# Patient Record
Sex: MALE | Race: WHITE | NOT HISPANIC OR LATINO
[De-identification: names, ages, dates, MRNs, and addresses within clinical notes are randomized per-mention and may not be internally consistent; named-entity substitution may affect disease eponyms.]

---

## 2018-07-18 ENCOUNTER — TRANSCRIPTION ENCOUNTER (OUTPATIENT)
Age: 28
End: 2018-07-18

## 2021-06-09 ENCOUNTER — EMERGENCY (EMERGENCY)
Facility: HOSPITAL | Age: 31
LOS: 1 days | End: 2021-06-09
Admitting: EMERGENCY MEDICINE
Payer: OTHER MISCELLANEOUS

## 2021-06-09 PROCEDURE — 99283 EMERGENCY DEPT VISIT LOW MDM: CPT | Mod: 25

## 2021-06-09 PROCEDURE — 99053 MED SERV 10PM-8AM 24 HR FAC: CPT

## 2021-06-10 PROCEDURE — 73130 X-RAY EXAM OF HAND: CPT | Mod: 26,RT

## 2021-06-10 PROCEDURE — 73110 X-RAY EXAM OF WRIST: CPT | Mod: 26,RT

## 2023-07-28 PROBLEM — Z00.00 ENCOUNTER FOR PREVENTIVE HEALTH EXAMINATION: Status: ACTIVE | Noted: 2023-07-28

## 2023-08-03 ENCOUNTER — APPOINTMENT (OUTPATIENT)
Dept: ORTHOPEDIC SURGERY | Facility: CLINIC | Age: 33
End: 2023-08-03
Payer: OTHER MISCELLANEOUS

## 2023-08-03 ENCOUNTER — APPOINTMENT (OUTPATIENT)
Dept: MRI IMAGING | Facility: CLINIC | Age: 33
End: 2023-08-03
Payer: OTHER MISCELLANEOUS

## 2023-08-03 VITALS — WEIGHT: 190 LBS | HEIGHT: 70 IN | BODY MASS INDEX: 27.2 KG/M2

## 2023-08-03 DIAGNOSIS — Z78.9 OTHER SPECIFIED HEALTH STATUS: ICD-10-CM

## 2023-08-03 PROCEDURE — 99214 OFFICE O/P EST MOD 30 MIN: CPT

## 2023-08-03 PROCEDURE — 73564 X-RAY EXAM KNEE 4 OR MORE: CPT | Mod: LT

## 2023-08-03 PROCEDURE — 73721 MRI JNT OF LWR EXTRE W/O DYE: CPT | Mod: LT

## 2023-08-04 NOTE — HISTORY OF PRESENT ILLNESS
[de-identified] : The patient is a 33 year year old right hand dominant male  who presents today for left knee pain. Date of Injury/Onset: 7/24/23 Pain: At Rest: 4/10 With Activity:  6-7/10 Mechanism of injury: while chasing a suspect at work his left leg got stuck between a car door and parked van causing his knee to get injured This is  a Work Related Injury being treated under Worker's Compensation. This is not an athletic injury occurring associated with an interscholastic or organized sports team. Quality of symptoms: sharp, dull, clicking Improves with: ice, tylenol Worse with: prolonged standing/sitting/walking Treatment/Imaging/Studies Since Last Visit:  Reports Available For Review Today:  Out of work/sport: out of work since 7/24/23 School/Sport/Position/Occupation: [employer]  Additional Information: none

## 2023-08-04 NOTE — DATA REVIEWED
[MRI] : MRI [Left] : left [Knee] : knee [Report was reviewed and noted in the chart] : The report was reviewed and noted in the chart [I independently reviewed and interpreted images and report] : I independently reviewed and interpreted images and report [I reviewed the films/CD and additionally noted] : I reviewed the films/CD and additionally noted [FreeTextEntry1] : contusion over the proximal MCL

## 2023-08-04 NOTE — IMAGING
[Left] : left knee [All Views] : anteroposterior, lateral, skyline, and anteroposterior standing [There are no fractures, subluxations or dislocations. No significant abnormalities are seen] : There are no fractures, subluxations or dislocations. No significant abnormalities are seen [de-identified] : The patient is a well appearing 33 year  old male of their stated age.  Patient ambulates with a normal gait.  Negative straight leg raise bilateral   Effected Knee: LEFT                         	  ROM:  0-145 degrees  Lachman: Negative  Pivot Shift: Negative  Anterior Drawer: Negative  Posterior Drawer / Sag:Negative  Varus Stress 0 degrees: Stable  Varus Stress 30 degrees: Stable  Valgus Stress 0 degrees: Stable  Valgus Stress 30 degrees: 2+ GAPPING  Medial Kee: Negative  Lateral Kee: Negative  Patella Glide: 2+  Patella Apprehension: Negative  Patella Grind: Negative   Palpation:  Medial Joint Line: TENDER  Lateral Joint Line: Nontender  Medial Collateral Ligament: TENDER PROXIMALLY  Lateral Collateral Ligament/PLC: Nontender  Distal Femur: Nontender  Proximal Tibia: Nontender  Tibial Tubercle: Nontender  Distal Pole Patella: Nontender  Quadriceps Tendon: Nontender &  Intact  Patella Tendon: Nontender &  Intact  Medial Femoral Condyle: Nontender  Medial Distal Hamstring/PES: Nontender  Lateral Distal Hamstring: Nontender  Medial Femoral Condyle: TENDER  Iliotibial Band: Nontender & Intact  Medial Patellofemoral Ligament: Nontender  Adductor: Nontender  Proximal GSC-Plantaris: Nontender  Calf: Supple & Nontender   Inspection:  Deformity: No  Erythema: No  Ecchymosis: No  Abrasions: No  Effusion: No  Prepatella Bursitis: No  Neurologic Exam:  Sensation L4-S1: Grossly Intact  Motor Exam:  Quadriceps: 5 out of 5  Hamstrings: 5 out of 5  EHL: 5 out of 5  FHL: 5 out of 5  TA: 5 out of 5  GS: 5 out of 5  Circulatory/Pulses:  Dorsalis Pedis: 2+  Posterior Tibialis: 2+  Additional Pertinent Findings: None   Contralateral Knee:                           	  ROM: 0-145 degrees  Other Pertinent Findings: None   Assessment: The patient is a 33 year  old male  with left knee pain and radiographic and physical exam findings consistent with knee contusion.     The patients condition is acute  Documents/Results Reviewed Today: X-Ray left knee and STAT MRI left knee  Tests/Studies Independently Interpreted Today: X-Ray left knee is unremarkable. STAT MRI left knee reveals contusion over the proximal MCL  Pertinent findings include: medial femoral condyle tenderness, medial joint line tenderness, proximal MCL tenderness, 2+ gapping at 30 degrees valgus  Confounding medical conditions/concerns: None  Plan: Patient will start physical therapy, HEP, and stretching. Take OTC antiinflammatories as needed - use as directed. Patient is permitted to work as tolerated. Discussed activity modifications. Patient is cleared to advance activity as tolerated.  Tests Ordered: None  Prescription Medications Ordered: Discussed appropriate use of OTC anti-inflammatories and topical analgesics (including but not limited to Aleve, Advil, Tylenol, Motrin, Ibuprofen, Voltaren gel, etc.)  Braces/DME Ordered: None  Activity/Work/Sports Status: None  Additional Instructions: None Follow-Up: PRN  The patient's current medication management of their orthopedic diagnosis was reviewed today: (1) We discussed a comprehensive treatment plan that included possible pharmaceutical management involving the use of prescription strength medications including but not limited to options such as oral Naprosyn 500mg BID, once daily Meloxicam 15 mg, or 500-650 mg Tylenol versus over the counter oral medications and topical prescription NSAID Pennsaid vs over the counter Voltaren gel.  Based on our extensive discussion, the patient declined prescription medication and will use over the counter Advil, Alleve, Voltaren Gel or Tylenol as directed. (2) There is a moderate risk of morbidity with further treatment, especially from use of prescription strength medications and possible side effects of these medications which include upset stomach with oral medications, skin reactions to topical medications and cardiac/renal issues with long term use. (3) I recommended that the patient follow-up with their medical physician to discuss any significant specific potential issues with long term medication use such as interactions with current medications or with exacerbation of underlying medical comorbidities. (4) The benefits and risks associated with use of injectable, oral or topical, prescription and over the counter anti-inflammatory medications were discussed with the patient. The patient voiced understanding of the risks including but not limited to bleeding, stroke, kidney dysfunction, heart disease, and were referred to the black box warning label for further information.   I, Genevieve Benz, attest that this documentation has been prepared under the direction and in the presence of Provider Dr. Rm Nguyen.  The documentation recorded by the scribe accurately reflects the services IDr. Rm, personally performed and the decisions made by me.

## 2023-08-08 ENCOUNTER — APPOINTMENT (OUTPATIENT)
Dept: ORTHOPEDIC SURGERY | Facility: CLINIC | Age: 33
End: 2023-08-08

## 2023-08-09 ENCOUNTER — NON-APPOINTMENT (OUTPATIENT)
Age: 33
End: 2023-08-09

## 2023-08-24 ENCOUNTER — APPOINTMENT (OUTPATIENT)
Dept: ORTHOPEDIC SURGERY | Facility: CLINIC | Age: 33
End: 2023-08-24

## 2023-09-15 ENCOUNTER — NON-APPOINTMENT (OUTPATIENT)
Age: 33
End: 2023-09-15

## 2024-04-11 ENCOUNTER — APPOINTMENT (OUTPATIENT)
Dept: ORTHOPEDIC SURGERY | Facility: CLINIC | Age: 34
End: 2024-04-11
Payer: OTHER MISCELLANEOUS

## 2024-04-11 VITALS — BODY MASS INDEX: 27.2 KG/M2 | WEIGHT: 190 LBS | HEIGHT: 70 IN

## 2024-04-11 PROCEDURE — 99213 OFFICE O/P EST LOW 20 MIN: CPT

## 2024-04-11 NOTE — IMAGING
[de-identified] : The patient is a well appearing 34 year  old male of their stated age.  Patient ambulates with a normal gait.  Negative straight leg raise bilateral   Effected Knee: LEFT                         	  ROM:  0-145 degrees  Lachman: Negative  Pivot Shift: Negative  Anterior Drawer: Negative  Posterior Drawer / Sag:Negative  Varus Stress 0 degrees: Stable  Varus Stress 30 degrees: Stable  Valgus Stress 0 degrees: Stable  Valgus Stress 30 degrees: 2+ GAPPING  Medial Kee: Negative  Lateral Kee: Negative  Patella Glide: 2+  Patella Apprehension: Negative  Patella Grind: Negative   Palpation:  Medial Joint Line: Nontender  Lateral Joint Line: Nontender  Medial Collateral Ligament: Nontender   Lateral Collateral Ligament/PLC: Nontender  Distal Femur: Nontender  Proximal Tibia: Nontender  Tibial Tubercle: Nontender  Distal Pole Patella: Nontender  Quadriceps Tendon: Nontender &  Intact  Patella Tendon: Nontender &  Intact  Medial Femoral Condyle: Nontender  Medial Distal Hamstring/PES: Nontender  Lateral Distal Hamstring: Nontender  Medial Femoral Condyle: Nontender  Iliotibial Band: Nontender & Intact  Medial Patellofemoral Ligament: Nontender  Adductor: Nontender  Proximal GSC-Plantaris: Nontender  Calf: Supple & Nontender   Inspection:  Deformity: No  Erythema: No  Ecchymosis: No  Abrasions: No  Effusion: No  Prepatella Bursitis: No  Neurologic Exam:  Sensation L4-S1: Grossly Intact  Motor Exam:  Quadriceps: 4 out of 5  Hamstrings: 5 out of 5  EHL: 5 out of 5  FHL: 5 out of 5  TA: 5 out of 5  GS: 5 out of 5  Circulatory/Pulses:  Dorsalis Pedis: 2+  Posterior Tibialis: 2+  Additional Pertinent Findings: None   Contralateral Knee:                           	  ROM: 0-145 degrees  Other Pertinent Findings: None   Assessment: The patient is a 33 year  old male  with left knee pain and radiographic and physical exam findings consistent with knee contusion.     The patients condition is acute  Documents/Results Reviewed Today: None  Tests/Studies Independently Interpreted Today: None  Pertinent findings include: 4/5 quadriceps strength, no tenderness at this time  Confounding medical conditions/concerns: None  Plan: Again, we discussed treatment options for the patient's knee contusion. Patient will start physical therapy, HEP, and stretching to work on quad strengthening. Take OTC antiinflammatories as needed - use as directed. Patient is permitted to work as tolerated.  Tests Ordered: None  Prescription Medications Ordered: Discussed appropriate use of OTC anti-inflammatories and topical analgesics (including but not limited to Aleve, Advil, Tylenol, Motrin, Ibuprofen, Voltaren gel, etc.)  Braces/DME Ordered: None  Activity/Work/Sports Status: None  Additional Instructions: None Follow-Up: 4 weeks   The patient's current medication management of their orthopedic diagnosis was reviewed today: (1) We discussed a comprehensive treatment plan that included possible pharmaceutical management involving the use of prescription strength medications including but not limited to options such as oral Naprosyn 500mg BID, once daily Meloxicam 15 mg, or 500-650 mg Tylenol versus over the counter oral medications and topical prescription NSAID Pennsaid vs over the counter Voltaren gel.  Based on our extensive discussion, the patient declined prescription medication and will use over the counter Advil, Alleve, Voltaren Gel or Tylenol as directed. (2) There is a moderate risk of morbidity with further treatment, especially from use of prescription strength medications and possible side effects of these medications which include upset stomach with oral medications, skin reactions to topical medications and cardiac/renal issues with long term use. (3) I recommended that the patient follow-up with their medical physician to discuss any significant specific potential issues with long term medication use such as interactions with current medications or with exacerbation of underlying medical comorbidities. (4) The benefits and risks associated with use of injectable, oral or topical, prescription and over the counter anti-inflammatory medications were discussed with the patient. The patient voiced understanding of the risks including but not limited to bleeding, stroke, kidney dysfunction, heart disease, and were referred to the black box warning label for further information.  Daniela HURST attest that this documentation has been prepared under the direction and in the presence of Provider Dr. Rm Nguyen.   The documentation recorded by the scribe accurately reflects the service Stefani HURST PA-C, personally performed and the decisions made by me. The patient was seen by me under the direct supervision of Dr. Rm Nguyen.

## 2024-04-11 NOTE — WORK
[Other: ___] : [unfilled] [Was the competent medical cause of the injury] : was the competent medical cause of the injury [Are consistent with the injury] : are consistent with the injury [Consistent with my objective findings] : consistent with my objective findings [Partial] : partial [Does not reveal pre-existing condition(s) that may affect treatment/prognosis] : does not reveal pre-existing condition(s) that may affect treatment/prognosis [Patient] : patient [No Rx restrictions] : No Rx restrictions. [I provided the services listed above] :  I provided the services listed above.

## 2024-04-11 NOTE — HISTORY OF PRESENT ILLNESS
[de-identified] : The patient is a 34 year year old right hand dominant male  who presents today for left knee pain. Date of Injury/Onset: 7/24/23 Pain: At Rest: 0-1/10 With Activity:  3-4/10 Mechanism of injury: while chasing a suspect at work his left leg got stuck between a car door and parked van causing his knee to get injured This is  a Work Related Injury being treated under Worker's Compensation. This is not an athletic injury occurring associated with an interscholastic or organized sports team. Quality of symptoms: random sharp pain Improves with: ice, tylenol Worse with: prolonged standing/sitting/walking Treatment/Imaging/Studies Since Last Visit: HEP/stretching                              Reports Available For Review Today:  Out of work/sport: currently working School/Sport/Position/Occupation: [employer]  Changes since last visit: patient reports overall he has been feeling better since last visit. w/c department recommended he should do some PT because he still will get random bouts of pain.  Additional Information: none

## 2024-05-23 ENCOUNTER — APPOINTMENT (OUTPATIENT)
Dept: ORTHOPEDIC SURGERY | Facility: CLINIC | Age: 34
End: 2024-05-23
Payer: OTHER MISCELLANEOUS

## 2024-05-23 VITALS — WEIGHT: 190 LBS | HEIGHT: 70 IN | BODY MASS INDEX: 27.2 KG/M2

## 2024-05-23 DIAGNOSIS — M25.562 PAIN IN LEFT KNEE: ICD-10-CM

## 2024-05-23 DIAGNOSIS — S80.02XA CONTUSION OF LEFT KNEE, INITIAL ENCOUNTER: ICD-10-CM

## 2024-05-23 PROCEDURE — 99213 OFFICE O/P EST LOW 20 MIN: CPT

## 2024-05-24 NOTE — HISTORY OF PRESENT ILLNESS
[de-identified] : The patient is a 34 year year old right hand dominant male  who presents today for left knee pain. Date of Injury/Onset: 7/24/23 Pain: At Rest: 0/10 With Activity:  2/10 Mechanism of injury: while chasing a suspect at work his left leg got stuck between a car door and parked van causing his knee to get injured This is  a Work Related Injury being treated under Worker's Compensation. This is not an athletic injury occurring associated with an interscholastic or organized sports team. Quality of symptoms: weakness, instability Improves with: ice, tylenol Worse with: running Treatment/Imaging/Studies Since Last Visit: PT, HEP                              Reports Available For Review Today:  Out of work/sport: currently working School/Sport/Position/Occupation: [employer]  Changes since last visit: Patient reports that his symptoms have improved since going to PT. Continues to see improvement in strength with PT,  Additional Information: none

## 2024-05-24 NOTE — IMAGING
[de-identified] : The patient is a well appearing 34 year  old male of their stated age.  Patient ambulates with a normal gait.  Negative straight leg raise bilateral   Effected Knee: LEFT                         	  ROM:  0-145 degrees  Lachman: Negative  Pivot Shift: Negative  Anterior Drawer: Negative  Posterior Drawer / Sag:Negative  Varus Stress 0 degrees: Stable  Varus Stress 30 degrees: Stable  Valgus Stress 0 degrees: Stable  Valgus Stress 30 degrees: 2+ GAPPING  Medial Kee: Negative  Lateral Kee: Negative  Patella Glide: 2+  Patella Apprehension: Negative  Patella Grind: Negative   Palpation:  Medial Joint Line: Nontender  Lateral Joint Line: Nontender  Medial Collateral Ligament: Nontender   Lateral Collateral Ligament/PLC: Nontender  Distal Femur: Nontender  Proximal Tibia: Nontender  Tibial Tubercle: Nontender  Distal Pole Patella: Nontender  Quadriceps Tendon: Nontender &  Intact  Patella Tendon: Nontender &  Intact  Medial Femoral Condyle: Nontender  Medial Distal Hamstring/PES: Nontender  Lateral Distal Hamstring: Nontender  Medial Femoral Condyle: Nontender  Iliotibial Band: Nontender & Intact  Medial Patellofemoral Ligament: Nontender  Adductor: Nontender  Proximal GSC-Plantaris: Nontender  Calf: Supple & Nontender   Inspection:  Deformity: No  Erythema: No  Ecchymosis: No  Abrasions: No  Effusion: No  Prepatella Bursitis: No  Neurologic Exam:  Sensation L4-S1: Grossly Intact  Motor Exam:  Quadriceps: 5 out of 5  Hamstrings: 5 out of 5  EHL: 5 out of 5  FHL: 5 out of 5  TA: 5 out of 5  GS: 5 out of 5  Circulatory/Pulses:  Dorsalis Pedis: 2+  Posterior Tibialis: 2+  Additional Pertinent Findings: None   Contralateral Knee:                           	  ROM: 0-145 degrees  Other Pertinent Findings: None   Assessment: The patient is a 33 year old male with left knee pain and radiographic and physical exam findings consistent with knee contusion.     The patients condition is acute  Documents/Results Reviewed Today: None  Tests/Studies Independently Interpreted Today: None  Pertinent findings include: 0-145, 5/5 quadriceps strength, no tenderness at this time  Confounding medical conditions/concerns: None  Plan: The patient reports gradual, interval improvement in pain and mechanical symptoms related to knee contusion. At this time, the patient has no activity restrictions and will continue to increase on an interval basis. He will finish out physical therapy, transitioning into HEP and strengthening. He will follow up for a [facility], otherwise PRN.   Tests Ordered: None  Prescription Medications Ordered: Discussed appropriate use of OTC anti-inflammatories and topical analgesics (including but not limited to Aleve, Advil, Tylenol, Motrin, Ibuprofen, Voltaren gel, etc.)  Braces/DME Ordered: None  Activity/Work/Sports Status: Currently working  Additional Instructions: None Follow-Up: PRN or for SL[facility]   The patient's current medication management of their orthopedic diagnosis was reviewed today: (1) We discussed a comprehensive treatment plan that included possible pharmaceutical management involving the use of prescription strength medications including but not limited to options such as oral Naprosyn 500mg BID, once daily Meloxicam 15 mg, or 500-650 mg Tylenol versus over the counter oral medications and topical prescription NSAID Pennsaid vs over the counter Voltaren gel.  Based on our extensive discussion, the patient declined prescription medication and will use over the counter Advil, Alleve, Voltaren Gel or Tylenol as directed. (2) There is a moderate risk of morbidity with further treatment, especially from use of prescription strength medications and possible side effects of these medications which include upset stomach with oral medications, skin reactions to topical medications and cardiac/renal issues with long term use. (3) I recommended that the patient follow-up with their medical physician to discuss any significant specific potential issues with long term medication use such as interactions with current medications or with exacerbation of underlying medical comorbidities. (4) The benefits and risks associated with use of injectable, oral or topical, prescription and over the counter anti-inflammatory medications were discussed with the patient. The patient voiced understanding of the risks including but not limited to bleeding, stroke, kidney dysfunction, heart disease, and were referred to the black box warning label for further information.  Daniela HURST attest that this documentation has been prepared under the direction and in the presence of Provider Dr. Rm Nguyen.   The documentation recorded by the scribe accurately reflects the services IDr. Rm, personally performed and the decisions made by me.

## 2024-09-19 ENCOUNTER — APPOINTMENT (OUTPATIENT)
Dept: ORTHOPEDIC SURGERY | Facility: CLINIC | Age: 34
End: 2024-09-19

## 2024-09-19 VITALS — WEIGHT: 190 LBS | HEIGHT: 70 IN | BODY MASS INDEX: 27.2 KG/M2

## 2024-09-19 DIAGNOSIS — M25.562 PAIN IN LEFT KNEE: ICD-10-CM

## 2024-09-19 DIAGNOSIS — S80.02XA CONTUSION OF LEFT KNEE, INITIAL ENCOUNTER: ICD-10-CM

## 2024-09-19 PROCEDURE — 99243 OFF/OP CNSLTJ NEW/EST LOW 30: CPT

## 2024-09-19 NOTE — IMAGING
[de-identified] : The patient is a well appearing 34 year  old male of their stated age.  Patient ambulates with a normal gait.  Negative straight leg raise bilateral   Effected Knee: LEFT                         	  ROM: Active and Passive with goniometer 0-135 degrees,  0-135 degrees,  0-135 degrees Lachman: Negative  Pivot Shift: Negative  Anterior Drawer: Negative  Posterior Drawer / Sag:Negative  Varus Stress 0 degrees: Stable  Varus Stress 30 degrees: Stable  Valgus Stress 0 degrees: Stable  Valgus Stress 30 degrees: Stable  Medial Kee: Negative  Lateral Kee: Negative  Patella Glide: 2+  Patella Apprehension: Negative  Patella Grind: Negative   Palpation:  Medial Joint Line: Nontender  Lateral Joint Line: Nontender  Medial Collateral Ligament: Nontender   Lateral Collateral Ligament/PLC: Nontender  Distal Femur: Nontender  Proximal Tibia: Nontender  Tibial Tubercle: Nontender  Distal Pole Patella: Nontender  Quadriceps Tendon: Nontender &  Intact  Patella Tendon: Nontender &  Intact  Medial Femoral Condyle: Nontender  Medial Distal Hamstring/PES: Nontender  Lateral Distal Hamstring: Nontender  Medial Femoral Condyle: Nontender  Iliotibial Band: Nontender & Intact  Medial Patellofemoral Ligament: Nontender  Adductor: Nontender  Proximal GSC-Plantaris: Nontender  Calf: Supple & Nontender   Inspection:  Deformity: No  Erythema: No  Ecchymosis: No  Abrasions: No  Effusion: No  Prepatella Bursitis: No  Neurologic Exam:  Sensation L4-S1: Grossly Intact  Motor Exam:  Quadriceps: 5- out of 5  Quadriceps Girth: 46.5cm, measured 10cm about superior pole patella Hamstrings: 5 out of 5  EHL: 5 out of 5  FHL: 5 out of 5  TA: 5 out of 5  GS: 5 out of 5  Circulatory/Pulses:  Dorsalis Pedis: 2+  Posterior Tibialis: 2+  Additional Pertinent Findings: None   Contralateral Knee:                           	  ROM: 0-145 degrees Quadriceps Girth: 47cm, measured 10cm about superior pole patella  Other Pertinent Findings: None   Assessment: The patient is a 34 year old male with left knee pain and radiographic and physical exam findings consistent with knee contusion and secondary quadriceps atrophy.     The patients condition is acute  Documents/Results Reviewed Today: None  Tests/Studies Independently Interpreted Today: None  Pertinent findings include: 0-135, 0-135, 0-135, 5-/5 quad strength. Contralateral Knee: 0-145, 0-145, 0-145. Left quad girth 46.5cm, right quad girth 47cm measured 10cm about superior pole patella Confounding medical conditions/concerns: None  Plan: The patient presents for a scheduled loss of use. Discussed appropriate activity modifications as the patient continues to advance activity. The patient has reached maximum medical benefit. It is within a reasonable degree of medical certainty that the injury is causally related to the work related injury on the date noted above. The patient has reached maximum medical benefit and based on the [facility] 2018 worker's compensation guidelines, schedule loss of use of the left knee is 10%.  It is within a reasonable degree of medical certainty that the injury is causally related to the work related injury on the date noted above. Tests Ordered: None  Prescription Medications Ordered: Discussed appropriate use of OTC anti-inflammatories and topical analgesics (including but not limited to Aleve, Advil, Tylenol, Motrin, Ibuprofen, Voltaren gel, etc.)  Braces/DME Ordered: None  Activity/Work/Sports Status: Currently working  Additional Instructions: None Follow-Up: PRN  The patient's current medication management of their orthopedic diagnosis was reviewed today: (1) We discussed a comprehensive treatment plan that included possible pharmaceutical management involving the use of prescription strength medications including but not limited to options such as oral Naprosyn 500mg BID, once daily Meloxicam 15 mg, or 500-650 mg Tylenol versus over the counter oral medications and topical prescription NSAID Pennsaid vs over the counter Voltaren gel.  Based on our extensive discussion, the patient declined prescription medication and will use over the counter Advil, Alleve, Voltaren Gel or Tylenol as directed. (2) There is a moderate risk of morbidity with further treatment, especially from use of prescription strength medications and possible side effects of these medications which include upset stomach with oral medications, skin reactions to topical medications and cardiac/renal issues with long term use. (3) I recommended that the patient follow-up with their medical physician to discuss any significant specific potential issues with long term medication use such as interactions with current medications or with exacerbation of underlying medical comorbidities. (4) The benefits and risks associated with use of injectable, oral or topical, prescription and over the counter anti-inflammatory medications were discussed with the patient. The patient voiced understanding of the risks including but not limited to bleeding, stroke, kidney dysfunction, heart disease, and were referred to the black box warning label for further information.  IDaniela attest that this documentation has been prepared under the direction and in the presence of Provider Dr. Rm Nguyen.   The documentation recorded by the scribe accurately reflects the services IDr. Rm, personally performed and the decisions made by me.

## 2024-09-19 NOTE — WORK
[Has the patient reached Maximum Medical Improvement? If yes, indicate date___] : Yes, on [unfilled] [Is there permanent partial impairment?] : Yes [FreeTextEntry6] : Left knee [Left] : left [Yes] : Yes [FreeTextEntry7] : Knee [FreeTextEntry8] : 0-135 [de-identified] : 0-145 [de-identified] : Quad atrophy, loss of motion, residual pain [FreeTextEntry5] : 10

## 2024-09-19 NOTE — HISTORY OF PRESENT ILLNESS
[de-identified] : The patient is a 34 year old right hand dominant male who presents today for left knee pain. Date of Injury/Onset: 7/24/23 Pain: At Rest: 1/10 With Activity:  2/10 Mechanism of injury: while chasing a suspect at work his left leg got stuck between a car door and parked van causing his knee to get injured This is  a Work Related Injury being treated under Worker's Compensation. This is not an athletic injury occurring associated with an interscholastic or organized sports team. Quality of symptoms: soreness  Improves with: rest Worse with: prolonged activity  Treatment/Imaging/Studies Since Last Visit: PT until 7/2024, HEP                              Reports Available For Review Today:  Out of work/sport: currently working School/Sport/Position/Occupation: [employer]  Changes since last visit: Feeling better. Did PT until 7/2024. Some soreness with prolonged activity. Additional Information: none

## 2024-09-19 NOTE — WORK
[Has the patient reached Maximum Medical Improvement? If yes, indicate date___] : Yes, on [unfilled] [Is there permanent partial impairment?] : Yes [FreeTextEntry6] : Left knee [Left] : left [Yes] : Yes [FreeTextEntry7] : Knee [FreeTextEntry8] : 0-135 [de-identified] : 0-145 [de-identified] : Quad atrophy, loss of motion, residual pain [FreeTextEntry5] : 10

## 2024-09-19 NOTE — IMAGING
[de-identified] : The patient is a well appearing 34 year  old male of their stated age.  Patient ambulates with a normal gait.  Negative straight leg raise bilateral   Effected Knee: LEFT                         	  ROM: Active and Passive with goniometer 0-135 degrees,  0-135 degrees,  0-135 degrees Lachman: Negative  Pivot Shift: Negative  Anterior Drawer: Negative  Posterior Drawer / Sag:Negative  Varus Stress 0 degrees: Stable  Varus Stress 30 degrees: Stable  Valgus Stress 0 degrees: Stable  Valgus Stress 30 degrees: Stable  Medial Kee: Negative  Lateral Kee: Negative  Patella Glide: 2+  Patella Apprehension: Negative  Patella Grind: Negative   Palpation:  Medial Joint Line: Nontender  Lateral Joint Line: Nontender  Medial Collateral Ligament: Nontender   Lateral Collateral Ligament/PLC: Nontender  Distal Femur: Nontender  Proximal Tibia: Nontender  Tibial Tubercle: Nontender  Distal Pole Patella: Nontender  Quadriceps Tendon: Nontender &  Intact  Patella Tendon: Nontender &  Intact  Medial Femoral Condyle: Nontender  Medial Distal Hamstring/PES: Nontender  Lateral Distal Hamstring: Nontender  Medial Femoral Condyle: Nontender  Iliotibial Band: Nontender & Intact  Medial Patellofemoral Ligament: Nontender  Adductor: Nontender  Proximal GSC-Plantaris: Nontender  Calf: Supple & Nontender   Inspection:  Deformity: No  Erythema: No  Ecchymosis: No  Abrasions: No  Effusion: No  Prepatella Bursitis: No  Neurologic Exam:  Sensation L4-S1: Grossly Intact  Motor Exam:  Quadriceps: 5- out of 5  Quadriceps Girth: 46.5cm, measured 10cm about superior pole patella Hamstrings: 5 out of 5  EHL: 5 out of 5  FHL: 5 out of 5  TA: 5 out of 5  GS: 5 out of 5  Circulatory/Pulses:  Dorsalis Pedis: 2+  Posterior Tibialis: 2+  Additional Pertinent Findings: None   Contralateral Knee:                           	  ROM: 0-145 degrees Quadriceps Girth: 47cm, measured 10cm about superior pole patella  Other Pertinent Findings: None   Assessment: The patient is a 34 year old male with left knee pain and radiographic and physical exam findings consistent with knee contusion and secondary quadriceps atrophy.     The patients condition is acute  Documents/Results Reviewed Today: None  Tests/Studies Independently Interpreted Today: None  Pertinent findings include: 0-135, 0-135, 0-135, 5-/5 quad strength. Contralateral Knee: 0-145, 0-145, 0-145. Left quad girth 46.5cm, right quad girth 47cm measured 10cm about superior pole patella Confounding medical conditions/concerns: None  Plan: The patient presents for a scheduled loss of use. Discussed appropriate activity modifications as the patient continues to advance activity. The patient has reached maximum medical benefit. It is within a reasonable degree of medical certainty that the injury is causally related to the work related injury on the date noted above. The patient has reached maximum medical benefit and based on the [facility] 2018 worker's compensation guidelines, schedule loss of use of the left knee is 10%.  It is within a reasonable degree of medical certainty that the injury is causally related to the work related injury on the date noted above. Tests Ordered: None  Prescription Medications Ordered: Discussed appropriate use of OTC anti-inflammatories and topical analgesics (including but not limited to Aleve, Advil, Tylenol, Motrin, Ibuprofen, Voltaren gel, etc.)  Braces/DME Ordered: None  Activity/Work/Sports Status: Currently working  Additional Instructions: None Follow-Up: PRN  The patient's current medication management of their orthopedic diagnosis was reviewed today: (1) We discussed a comprehensive treatment plan that included possible pharmaceutical management involving the use of prescription strength medications including but not limited to options such as oral Naprosyn 500mg BID, once daily Meloxicam 15 mg, or 500-650 mg Tylenol versus over the counter oral medications and topical prescription NSAID Pennsaid vs over the counter Voltaren gel.  Based on our extensive discussion, the patient declined prescription medication and will use over the counter Advil, Alleve, Voltaren Gel or Tylenol as directed. (2) There is a moderate risk of morbidity with further treatment, especially from use of prescription strength medications and possible side effects of these medications which include upset stomach with oral medications, skin reactions to topical medications and cardiac/renal issues with long term use. (3) I recommended that the patient follow-up with their medical physician to discuss any significant specific potential issues with long term medication use such as interactions with current medications or with exacerbation of underlying medical comorbidities. (4) The benefits and risks associated with use of injectable, oral or topical, prescription and over the counter anti-inflammatory medications were discussed with the patient. The patient voiced understanding of the risks including but not limited to bleeding, stroke, kidney dysfunction, heart disease, and were referred to the black box warning label for further information.  IDaniela attest that this documentation has been prepared under the direction and in the presence of Provider Dr. Rm Nguyen.   The documentation recorded by the scribe accurately reflects the services IDr. Rm, personally performed and the decisions made by me.

## 2024-09-19 NOTE — HISTORY OF PRESENT ILLNESS
[de-identified] : The patient is a 34 year old right hand dominant male who presents today for left knee pain. Date of Injury/Onset: 7/24/23 Pain: At Rest: 1/10 With Activity:  2/10 Mechanism of injury: while chasing a suspect at work his left leg got stuck between a car door and parked van causing his knee to get injured This is  a Work Related Injury being treated under Worker's Compensation. This is not an athletic injury occurring associated with an interscholastic or organized sports team. Quality of symptoms: soreness  Improves with: rest Worse with: prolonged activity  Treatment/Imaging/Studies Since Last Visit: PT until 7/2024, HEP                              Reports Available For Review Today:  Out of work/sport: currently working School/Sport/Position/Occupation: [employer]  Changes since last visit: Feeling better. Did PT until 7/2024. Some soreness with prolonged activity. Additional Information: none

## 2025-04-10 ENCOUNTER — APPOINTMENT (OUTPATIENT)
Dept: ORTHOPEDIC SURGERY | Facility: CLINIC | Age: 35
End: 2025-04-10
Payer: OTHER MISCELLANEOUS

## 2025-04-10 DIAGNOSIS — M25.562 PAIN IN LEFT KNEE: ICD-10-CM

## 2025-04-10 DIAGNOSIS — S80.02XA CONTUSION OF LEFT KNEE, INITIAL ENCOUNTER: ICD-10-CM

## 2025-04-10 PROCEDURE — 99243 OFF/OP CNSLTJ NEW/EST LOW 30: CPT

## 2025-08-18 ENCOUNTER — APPOINTMENT (OUTPATIENT)
Dept: ORTHOPEDIC SURGERY | Facility: CLINIC | Age: 35
End: 2025-08-18
Payer: OTHER MISCELLANEOUS

## 2025-08-18 PROCEDURE — 99204 OFFICE O/P NEW MOD 45 MIN: CPT

## 2025-08-20 ENCOUNTER — APPOINTMENT (OUTPATIENT)
Dept: ORTHOPEDIC SURGERY | Facility: CLINIC | Age: 35
End: 2025-08-20
Payer: OTHER MISCELLANEOUS

## 2025-08-20 ENCOUNTER — APPOINTMENT (OUTPATIENT)
Dept: MRI IMAGING | Facility: CLINIC | Age: 35
End: 2025-08-20
Payer: OTHER MISCELLANEOUS

## 2025-08-20 VITALS — HEIGHT: 70 IN | BODY MASS INDEX: 27.2 KG/M2 | WEIGHT: 190 LBS

## 2025-08-20 DIAGNOSIS — M25.511 PAIN IN RIGHT SHOULDER: ICD-10-CM

## 2025-08-20 DIAGNOSIS — M79.18 MYALGIA, OTHER SITE: ICD-10-CM

## 2025-08-20 DIAGNOSIS — S29.011A STRAIN OF MUSCLE AND TENDON OF FRONT WALL OF THORAX, INITIAL ENCOUNTER: ICD-10-CM

## 2025-08-20 PROCEDURE — 71550 MRI CHEST W/O DYE: CPT | Mod: RT

## 2025-08-20 PROCEDURE — 99214 OFFICE O/P EST MOD 30 MIN: CPT
